# Patient Record
Sex: FEMALE | Race: WHITE | HISPANIC OR LATINO | Employment: OTHER | URBAN - METROPOLITAN AREA
[De-identification: names, ages, dates, MRNs, and addresses within clinical notes are randomized per-mention and may not be internally consistent; named-entity substitution may affect disease eponyms.]

---

## 2022-03-07 ENCOUNTER — HOSPITAL ENCOUNTER (OUTPATIENT)
Dept: RADIOLOGY | Facility: MEDICAL CENTER | Age: 62
End: 2022-03-07
Attending: STUDENT IN AN ORGANIZED HEALTH CARE EDUCATION/TRAINING PROGRAM
Payer: COMMERCIAL

## 2022-03-07 DIAGNOSIS — Z12.31 ENCOUNTER FOR MAMMOGRAM TO ESTABLISH BASELINE MAMMOGRAM: ICD-10-CM

## 2022-03-07 PROCEDURE — 77063 BREAST TOMOSYNTHESIS BI: CPT

## 2022-03-18 ENCOUNTER — HOSPITAL ENCOUNTER (OUTPATIENT)
Dept: RADIOLOGY | Facility: MEDICAL CENTER | Age: 62
End: 2022-03-18
Payer: COMMERCIAL

## 2023-02-01 ENCOUNTER — HOSPITAL ENCOUNTER (OUTPATIENT)
Dept: RADIOLOGY | Facility: MEDICAL CENTER | Age: 63
End: 2023-02-01
Attending: STUDENT IN AN ORGANIZED HEALTH CARE EDUCATION/TRAINING PROGRAM
Payer: COMMERCIAL

## 2023-02-01 DIAGNOSIS — N39.0 URINARY TRACT INFECTION WITHOUT HEMATURIA, SITE UNSPECIFIED: ICD-10-CM

## 2023-02-01 PROCEDURE — 76770 US EXAM ABDO BACK WALL COMP: CPT

## 2023-06-20 ENCOUNTER — HOSPITAL ENCOUNTER (OUTPATIENT)
Dept: RADIOLOGY | Facility: MEDICAL CENTER | Age: 63
End: 2023-06-20
Attending: STUDENT IN AN ORGANIZED HEALTH CARE EDUCATION/TRAINING PROGRAM
Payer: COMMERCIAL

## 2023-06-20 ENCOUNTER — OFFICE VISIT (OUTPATIENT)
Dept: RHEUMATOLOGY | Facility: MEDICAL CENTER | Age: 63
End: 2023-06-20
Attending: STUDENT IN AN ORGANIZED HEALTH CARE EDUCATION/TRAINING PROGRAM
Payer: COMMERCIAL

## 2023-06-20 ENCOUNTER — HOSPITAL ENCOUNTER (OUTPATIENT)
Dept: LAB | Facility: MEDICAL CENTER | Age: 63
End: 2023-06-20
Attending: STUDENT IN AN ORGANIZED HEALTH CARE EDUCATION/TRAINING PROGRAM
Payer: COMMERCIAL

## 2023-06-20 VITALS
BODY MASS INDEX: 35.08 KG/M2 | HEIGHT: 63 IN | OXYGEN SATURATION: 99 % | SYSTOLIC BLOOD PRESSURE: 122 MMHG | TEMPERATURE: 97.5 F | WEIGHT: 198 LBS | HEART RATE: 72 BPM | DIASTOLIC BLOOD PRESSURE: 82 MMHG

## 2023-06-20 DIAGNOSIS — M13.80 SERONEGATIVE INFLAMMATORY ARTHRITIS: ICD-10-CM

## 2023-06-20 DIAGNOSIS — D84.9 IMMUNOSUPPRESSED STATUS (HCC): ICD-10-CM

## 2023-06-20 DIAGNOSIS — Z79.899 LONG-TERM USE OF HYDROXYCHLOROQUINE: ICD-10-CM

## 2023-06-20 DIAGNOSIS — M17.0 PRIMARY OSTEOARTHRITIS OF BOTH KNEES: ICD-10-CM

## 2023-06-20 LAB
BASOPHILS # BLD AUTO: 0.8 % (ref 0–1.8)
BASOPHILS # BLD: 0.05 K/UL (ref 0–0.12)
EOSINOPHIL # BLD AUTO: 0.12 K/UL (ref 0–0.51)
EOSINOPHIL NFR BLD: 1.9 % (ref 0–6.9)
ERYTHROCYTE [DISTWIDTH] IN BLOOD BY AUTOMATED COUNT: 46.1 FL (ref 35.9–50)
ERYTHROCYTE [SEDIMENTATION RATE] IN BLOOD BY WESTERGREN METHOD: 6 MM/HOUR (ref 0–25)
HCT VFR BLD AUTO: 45.4 % (ref 37–47)
HGB BLD-MCNC: 14.8 G/DL (ref 12–16)
IMM GRANULOCYTES # BLD AUTO: 0.01 K/UL (ref 0–0.11)
IMM GRANULOCYTES NFR BLD AUTO: 0.2 % (ref 0–0.9)
LYMPHOCYTES # BLD AUTO: 1.35 K/UL (ref 1–4.8)
LYMPHOCYTES NFR BLD: 21.2 % (ref 22–41)
MCH RBC QN AUTO: 31 PG (ref 27–33)
MCHC RBC AUTO-ENTMCNC: 32.6 G/DL (ref 32.2–35.5)
MCV RBC AUTO: 95.2 FL (ref 81.4–97.8)
MONOCYTES # BLD AUTO: 0.58 K/UL (ref 0–0.85)
MONOCYTES NFR BLD AUTO: 9.1 % (ref 0–13.4)
NEUTROPHILS # BLD AUTO: 4.25 K/UL (ref 1.82–7.42)
NEUTROPHILS NFR BLD: 66.8 % (ref 44–72)
NRBC # BLD AUTO: 0 K/UL
NRBC BLD-RTO: 0 /100 WBC (ref 0–0.2)
PLATELET # BLD AUTO: 287 K/UL (ref 164–446)
PMV BLD AUTO: 9.2 FL (ref 9–12.9)
RBC # BLD AUTO: 4.77 M/UL (ref 4.2–5.4)
WBC # BLD AUTO: 6.4 K/UL (ref 4.8–10.8)

## 2023-06-20 PROCEDURE — 99212 OFFICE O/P EST SF 10 MIN: CPT | Performed by: STUDENT IN AN ORGANIZED HEALTH CARE EDUCATION/TRAINING PROGRAM

## 2023-06-20 PROCEDURE — 3079F DIAST BP 80-89 MM HG: CPT | Performed by: STUDENT IN AN ORGANIZED HEALTH CARE EDUCATION/TRAINING PROGRAM

## 2023-06-20 PROCEDURE — 99204 OFFICE O/P NEW MOD 45 MIN: CPT | Mod: GC | Performed by: STUDENT IN AN ORGANIZED HEALTH CARE EDUCATION/TRAINING PROGRAM

## 2023-06-20 PROCEDURE — 77077 JOINT SURVEY SINGLE VIEW: CPT

## 2023-06-20 PROCEDURE — 86140 C-REACTIVE PROTEIN: CPT

## 2023-06-20 PROCEDURE — 36415 COLL VENOUS BLD VENIPUNCTURE: CPT

## 2023-06-20 PROCEDURE — 85025 COMPLETE CBC W/AUTO DIFF WBC: CPT

## 2023-06-20 PROCEDURE — 85652 RBC SED RATE AUTOMATED: CPT

## 2023-06-20 PROCEDURE — 86812 HLA TYPING A B OR C: CPT

## 2023-06-20 PROCEDURE — 3074F SYST BP LT 130 MM HG: CPT | Performed by: STUDENT IN AN ORGANIZED HEALTH CARE EDUCATION/TRAINING PROGRAM

## 2023-06-20 PROCEDURE — 80053 COMPREHEN METABOLIC PANEL: CPT

## 2023-06-20 PROCEDURE — 83516 IMMUNOASSAY NONANTIBODY: CPT

## 2023-06-20 RX ORDER — ACETAMINOPHEN AND CODEINE PHOSPHATE 300; 30 MG/1; MG/1
TABLET ORAL
COMMUNITY

## 2023-06-20 RX ORDER — SUMATRIPTAN 100 MG/1
TABLET, FILM COATED ORAL
COMMUNITY
Start: 2021-10-24

## 2023-06-20 RX ORDER — ESTRADIOL 0.1 MG/G
CREAM VAGINAL
COMMUNITY
Start: 2021-10-22

## 2023-06-20 RX ORDER — HYDROXYCHLOROQUINE SULFATE 200 MG/1
TABLET, FILM COATED ORAL
COMMUNITY
Start: 2023-05-15 | End: 2023-08-01 | Stop reason: SDUPTHER

## 2023-06-20 RX ORDER — PERPHENAZINE/AMITRIPTYLINE HCL 2 MG-25 MG
1 TABLET ORAL DAILY
COMMUNITY
Start: 2021-10-22 | End: 2023-10-22

## 2023-06-20 RX ORDER — PROPRANOLOL HYDROCHLORIDE 40 MG/1
TABLET ORAL
COMMUNITY
Start: 2021-10-22

## 2023-06-20 RX ORDER — FLUTICASONE PROPIONATE 50 MCG
SPRAY, SUSPENSION (ML) NASAL
COMMUNITY

## 2023-06-20 ASSESSMENT — PATIENT HEALTH QUESTIONNAIRE - PHQ9: CLINICAL INTERPRETATION OF PHQ2 SCORE: 0

## 2023-06-20 NOTE — PROGRESS NOTES
[facility] RHEUMATOLOG[facility]  [address], [address], [place] [address]  Phone: [phone] ? Fax: [phone]    RHEUMATOLOGY NEW PATIENT VISIT NOTE      DATE OF SERVICE: 06/20/2023         Subjective     REFERRING PRACTITIONER:  Yelena Olmedo P.A.-C.  [address]  [address]  [place] [address]    PATIENT IDENTIFICATION:  Daisy Ugalde  [address]  [place] [address]    YOB: 1960    MEDICAL RECORD NUMBER: [id number]         CHIEF COMPLAINT:   Chief Complaint   Patient presents with    New Patient     Rheumatoid arthritis, unspecified       HISTORY OF PRESENT ILLNESS:  Daisy Ugalde is a 63 y.o. female with pertinent history notable for rheumatoid arthritis diagnosed in 10/2008, osteoarthritis of both knees, CKD stage 3, renal cell carcinoma s/p left nephrectomy in 2012. Presents for rheumatologic evaluation in the setting of her chronic rheumatoid arthritis. Patient was previously following with [facility] rheumatologist and has since moved to [place] 2 years ago. She is currently on Methotrexate 2.5mg at 6 tabs per week and hydroxychloroquine at 200mg daily. She was initially diagnosed with RA around 2008 and she reports she started the above therapy in 2012. She notes that in the morning she has stiffness and soreness for about 30 mins which resolves after moving her fingers around. She notes the pain is worse in her hands wrists and elbows. She mentions her bilateral osteoarthritis pain also gives her trouble at times. She manages it with tylenol and occasional topical diclofenac. She also mentions her right middle finger has a problem with locking up during the PM which has been ongoing for a few months now.     Pertinent treatment history: methotrexate and hydroxychloroquine, per patient report since 2012  Pertinent laboratory results: Low eGFR (53 on 1/2023), negative: RF, CCP, Sed Rate, CRP on 1/23/2023 labs    REVIEW OF SYSTEMS:  Except as  noted in the history above, relevant review of systems with emphasis on autoimmune rheumatic diseases was otherwise negative.      ACTIVE PROBLEM LIST:  Patient Active Problem List    Diagnosis Date Noted    Seronegative inflammatory arthritis 06/20/2023    Primary osteoarthritis of both knees 06/20/2023    Immunosuppressed status (HCC) 06/20/2023    Long-term use of hydroxychloroquine 06/20/2023       PAST MEDICAL HISTORY:  Rheumatoid arthritis  Carpal tunnel syndrome  Bilateral Osteoarthritis of the knee  CKD stage 3  History of Renal Cell carcinoma    PAST SURGICAL HISTORY:  Left Nephrectomy, 2012  Bilateral knee arthroscopy, 1982      MEDICATIONS:  Current Outpatient Medications   Medication Sig    FOLIC ACID PO Take  by mouth.    acetaminophen-codeine #3 (TYLENOL #3) 300-30 MG Tab acetaminophen 300 mg-codeine 30 mg tablet   TAKE 1 TABLET BY MOUTH EVERY 6 HOURS FOR UP TO 5 DAYS AS NEEDED FOR SEVERE PAIN    estradiol (ESTRACE) 0.1 MG/GM vaginal cream estradiol 0.01% (0.1 mg/gram) vaginal cream   APPLY 1 GRAM VAGINALLY TWICE A WEEK FOR 90 DAYS    fluticasone (FLONASE) 50 MCG/ACT nasal spray Allergy Relief (fluticasone) 50 mcg/actuation nasal spray,suspension   Spray 1 spray every day by intranasal route.    hydroxychloroquine (PLAQUENIL) 200 MG Tab TAKE 1 TABLET BY MOUTH DAILY WITH FOOD OR MILK. FOR RHEUMATOID ARTHRITIS    methotrexate 2.5 MG Tab Take 15 mg by mouth.    Multiple Vitamins-Minerals (WOMENS DAILY FORMULA) Tab Take 1 Tablet by mouth every day.    propranolol (INDERAL) 40 MG Tab propranolol 40 mg tablet   TAKE 1/2 TABLET BY MOUTH TWICE DAILY TO PREVENT MIGRAINES    sumatriptan (IMITREX) 100 MG tablet sumatriptan 100 mg tablet   Take by oral route.    Probiotic Product (PROBIOTIC PO) Take  by mouth.    D-MANNOSE PO Take  by mouth.       ALLERGIES:   Allergies   Allergen Reactions    Cat Hair Extract     Gold Rai Ult Wash-Exfoliating [Kdc:Cocamide+Cocamidopropyl+Yellow Dye+Aloe+Benzethonium  "Chloride+...]     Nsaids      History of nephrectomy    History of nephrectomy       IMMUNIZATIONS:  There is no immunization history on file for this patient.    SOCIAL HISTORY:   Social History     Socioeconomic History    Marital status:    Tobacco Use    Smoking status: Never    Smokeless tobacco: Never       FAMILY HISTORY:  History reviewed. No pertinent family history.         Objective     Vital Signs: /82 (BP Location: Left arm, Patient Position: Sitting, BP Cuff Size: Adult)   Pulse 72   Temp 36.4 °C (97.5 °F) (Temporal)   Ht 1.6 m (5' 3\")   Wt 89.8 kg (198 lb)   SpO2 99% Body mass index is 35.07 kg/m².    General: Appears well and comfortable  Eyes: No scleral or conjunctival lesions  ENT: No apparent oral or nasal lesions  Head/Neck: No apparent scalp or neck lesions  Cardiovascular: Regular rate and rhythm; no pericardial rubs  Respiratory: Breathing quiet and unlabored; no rales or pleural rubs  Gastrointestinal: No apparent organomegaly or abdominal masses  Integumentary: No significant cutaneous lesions or discolorations  Musculoskeletal: Mild tenderness to multiple joints; right middle finger MCP and PIP, mild tenderness of left hand middle and index finger PIP, bilateral shoulder, bilateral knees. No periarticular soft tissue swelling, warmth, erythema, or overt synovitis; no significant restriction in range of motion of joints examined  Neurologic: No focal sensory or motor deficits  Psychiatric: Mood and affect appropriate      LABORATORY RESULTS REVIEWED AND INTERPRETED BY ME:  Lab Results   Component Value Date/Time    HEMOGLOBIN 13.9 10/23/2021 09:07 AM    HEMATOCRIT 45.3 10/23/2021 09:07 AM     10/23/2021 09:07 AM    RDW 13.5 10/23/2021 09:07 AM    PLATELETCT 259 10/23/2021 09:07 AM    NEUTS 2.3 10/23/2021 09:07 AM    LYMPHOCYTES 27 10/23/2021 09:07 AM    MONOCYTES 11 10/23/2021 09:07 AM    EOSINOPHILS 2 10/23/2021 09:07 AM    BASOPHILS 1 10/23/2021 09:07 AM     Lab " Results   Component Value Date/Time    ASTSGOT 25 10/24/2019 11:01 PM    ALTSGPT 23 10/23/2021 09:07 AM     Lab Results   Component Value Date/Time    SODIUM 137 07/26/2021 07:22 AM    POTASSIUM 4.0 07/26/2021 07:22 AM    CHLORIDE 102 07/26/2021 07:22 AM    CO2 28 07/26/2021 07:22 AM    GLUCOSE 84 07/26/2021 07:22 AM    BUN 21 07/26/2021 07:22 AM    CREATININE 0.89 10/23/2021 09:07 AM     Lab Results   Component Value Date/Time    HBA1C 5.3 10/23/2021 09:07 AM       All relevant laboratory and imaging results reported on this note were reviewed and interpreted by me.         Assessment & Plan     Daisy Ugalde is a 63 y.o. female with history as noted above whose presentation merits the following diagnostic and clinical status impressions and recommendations:    1. Seronegative inflammatory arthritis  Clinically very low disease activity with no significant evidence of progressive inflammatory arthritis as most of her reported pain is biomechanical arthralgia likely secondary to osteoarthritis. Per review of [facility] records did have a mildly elevated RF in the past, most recent labs on 1/2023 noted negative RF, Anti-CCP, Sed rate and CRP. In addition to RA labs, will check anti-carbamylated protein antibody and HLA-B27. Depending on the results of testing may consider a trial of discontinuing her RA medications.   - Miscellaneous Test; Future (Anti-carbamylated protein antibody)  - HLA-B27; Future  - Sed Rate; Future  - CRP QUANTITIVE (NON-CARDIAC); Future  - DX-JOINT SURVEY-HANDS SINGLE VIEW; Future  - Continue methotrexate 15 mg oral weekly with folic acid 1 mg daily except the day of methotrexate  - Continue hydroxychloroquine 200 mg daily    2. Primary osteoarthritis of both knees  Presumably a significant etiology of knee pain burden which can be managed supportively.  - Voltaren 1% gel and Tylenol Arthritis PRN as she needs to avoid oral NSAIDs due to her CKD  - Consider intra-articular steroid injection  if that becomes necessary    3. Immunosuppressed status (HCC)  Presently with no reported history, physical exam, or lab abnormalities but need routine monitoring per guidelines.   - CBC WITH DIFFERENTIAL; Future  - Comp Metabolic Panel; Future    4. Long-term use of hydroxychloroquine  Minimal to no risk of retinal toxicity given the low dose of hydroxychloroquine prescribed (less than 5 mg/kg of body weight) per rheumatology and ophthalmology guidelines. However, ophthalmologic evaluation is still recommended with the frequency of routine follow-up eye exams determined by the ophthalmologist, typically annually or every other year.  - CBC WITH DIFFERENTIAL; Future  - Comp Metabolic Panel; Future  - Routine ophthalmology evaluation as recommended      The above assessment and plan were discussed with the patient who acknowledged understanding of the plan.    FOLLOW-UP: Return in about 6 months (around 12/20/2023) for Short.         Thank you for the opportunity to participate in the care of Daisy Ugalde.    Popeye Mcintosh M.D.  [facility] Internal Medicine Resident, PGY-3      ATTENDING ATTESTATION:  I personally saw and examined this patient and supervised the trainee who participated in the care of the patient. This documentation was initiated by the trainee using my office visit note template as instructed by me. I have carefully reviewed the note in its entirety and made appropriate modifications, and hereby attest that it accurately reflects the history, physical exam, assessment and plan of care for the patient.    Gustavo Roberts MD, MS  Rheumatologist, [facility] ? [facility]   of Clinical Medicine, Department of Internal Medicine  [facility] ? [facility]

## 2023-06-20 NOTE — PATIENT INSTRUCTIONS
AFTER VISIT INSTRUCTIONS    Below are important information to help you navigate your healthcare needs and help us serve you safely and effectively:  If laboratory tests and/or imaging studies were ordered, remember to go get them done as instructed.  If new prescriptions and/or refills were sent, remember to go pick them up from your local pharmacy, or call the specialty pharmacy to request shipment.  Always take your prescription medications exactly as prescribed unless instructed otherwise.  Note that antirheumatic drugs and steroids are immunosuppressive which means they increase your risk of infections and have multiple potential adverse effects on various organ systems in your body, though most of them are uncommon.  It is important that you are up-to-date on age-appropriate immunizations, particularly shingles and bacterial/viral pneumonia vaccines, which you can request from me or your primary care provider.  Be sure to read the drug package inserts to learn about the potential side effects of your medications before you start taking them.  If you experience any significant drug side effects, stop taking the medication and notify me promptly, and depending on the severity of the side effects, consider going to an urgent care or emergency department for immediate attention.  If there are significant findings on your lab tests and imaging studies that warrant further action, I will notify you with explanations via [email or web address] or phone call, otherwise you can view them on Epiclist and let me know if you have any questions.  Note that [employer] messages are typically read during office hours and may take 1-7 business days before a response depending on the urgency of the situation and how busy my clinic schedule is.  In general, [employer] messaging is for non-urgent matters that do not require immediate attention, so for urgent matters that cannot wait, you are advised to go to an urgent care.  Lastly, you are granted  MyChart access to my documentation of your visit and are encouraged to read my note which details my assessment and plan for your condition.

## 2023-06-21 LAB
ALBUMIN SERPL BCP-MCNC: 4.4 G/DL (ref 3.2–4.9)
ALBUMIN/GLOB SERPL: 1.5 G/DL
ALP SERPL-CCNC: 85 U/L (ref 30–99)
ALT SERPL-CCNC: 22 U/L (ref 2–50)
ANION GAP SERPL CALC-SCNC: 12 MMOL/L (ref 7–16)
AST SERPL-CCNC: 20 U/L (ref 12–45)
BILIRUB SERPL-MCNC: 0.4 MG/DL (ref 0.1–1.5)
BUN SERPL-MCNC: 24 MG/DL (ref 8–22)
CALCIUM ALBUM COR SERPL-MCNC: 9.5 MG/DL (ref 8.5–10.5)
CALCIUM SERPL-MCNC: 9.8 MG/DL (ref 8.5–10.5)
CHLORIDE SERPL-SCNC: 103 MMOL/L (ref 96–112)
CO2 SERPL-SCNC: 26 MMOL/L (ref 20–33)
CREAT SERPL-MCNC: 1.03 MG/DL (ref 0.5–1.4)
CRP SERPL HS-MCNC: <0.3 MG/DL (ref 0–0.75)
GFR SERPLBLD CREATININE-BSD FMLA CKD-EPI: 61 ML/MIN/1.73 M 2
GLOBULIN SER CALC-MCNC: 3 G/DL (ref 1.9–3.5)
GLUCOSE SERPL-MCNC: 83 MG/DL (ref 65–99)
POTASSIUM SERPL-SCNC: 5.1 MMOL/L (ref 3.6–5.5)
PROT SERPL-MCNC: 7.4 G/DL (ref 6–8.2)
SODIUM SERPL-SCNC: 141 MMOL/L (ref 135–145)

## 2023-06-22 LAB
CARBAMYLATED PROTEIN ANTIBODY, IGG Q6043: 12 UNITS (ref 0–19)
HLA-B27 QL FC: NEGATIVE

## 2023-07-03 ENCOUNTER — PATIENT MESSAGE (OUTPATIENT)
Dept: RHEUMATOLOGY | Facility: MEDICAL CENTER | Age: 63
End: 2023-07-03
Payer: COMMERCIAL

## 2023-07-03 DIAGNOSIS — M13.80 SERONEGATIVE INFLAMMATORY ARTHRITIS: ICD-10-CM

## 2023-07-28 DIAGNOSIS — M13.80 SERONEGATIVE INFLAMMATORY ARTHRITIS: ICD-10-CM

## 2023-08-01 DIAGNOSIS — M13.80 SERONEGATIVE INFLAMMATORY ARTHRITIS: ICD-10-CM

## 2023-08-01 DIAGNOSIS — Z79.899 LONG-TERM USE OF HYDROXYCHLOROQUINE: ICD-10-CM

## 2023-08-01 RX ORDER — HYDROXYCHLOROQUINE SULFATE 200 MG/1
TABLET, FILM COATED ORAL
Qty: 30 TABLET | Refills: 1 | Status: SHIPPED | OUTPATIENT
Start: 2023-08-01 | End: 2023-08-01

## 2023-08-01 RX ORDER — HYDROXYCHLOROQUINE SULFATE 200 MG/1
TABLET, FILM COATED ORAL
Qty: 30 TABLET | Refills: 1 | Status: SHIPPED | OUTPATIENT
Start: 2023-08-01

## 2023-11-06 ENCOUNTER — PATIENT MESSAGE (OUTPATIENT)
Dept: RHEUMATOLOGY | Facility: MEDICAL CENTER | Age: 63
End: 2023-11-06
Payer: COMMERCIAL

## 2023-11-06 DIAGNOSIS — M13.80 SERONEGATIVE INFLAMMATORY ARTHRITIS: ICD-10-CM

## 2023-11-06 DIAGNOSIS — D84.9 IMMUNOSUPPRESSED STATUS (HCC): ICD-10-CM

## 2023-12-03 ASSESSMENT — RHEUMATOLOGY FOLLOW-UP QUESTIONNAIRE
DURATION: >1 HOUR
CHIEF_COMPLAINT: FOLLOW UP
MARK ALL THE AREAS OF PAIN: 94052226
HELPFUL_MEDICATIONS: TYLENOL
RATE_1TO10: 3
JOINT SWELLING: Y
JOINT INSTABILITY: Y
CHARACTERISTIC: BETTER WITH ACTIVITY
JOINT PAIN: WORSE WITH ACTIVITY

## 2023-12-05 ENCOUNTER — OFFICE VISIT (OUTPATIENT)
Dept: RHEUMATOLOGY | Facility: MEDICAL CENTER | Age: 63
End: 2023-12-05
Attending: STUDENT IN AN ORGANIZED HEALTH CARE EDUCATION/TRAINING PROGRAM
Payer: COMMERCIAL

## 2023-12-05 VITALS
HEIGHT: 63 IN | SYSTOLIC BLOOD PRESSURE: 130 MMHG | BODY MASS INDEX: 38.45 KG/M2 | DIASTOLIC BLOOD PRESSURE: 84 MMHG | HEART RATE: 85 BPM | WEIGHT: 217 LBS | TEMPERATURE: 97.2 F | OXYGEN SATURATION: 97 %

## 2023-12-05 DIAGNOSIS — Z79.899 LONG-TERM USE OF HYDROXYCHLOROQUINE: ICD-10-CM

## 2023-12-05 DIAGNOSIS — D84.9 IMMUNOSUPPRESSED STATUS (HCC): ICD-10-CM

## 2023-12-05 DIAGNOSIS — M17.0 PRIMARY OSTEOARTHRITIS OF BOTH KNEES: ICD-10-CM

## 2023-12-05 DIAGNOSIS — M13.80 SERONEGATIVE INFLAMMATORY ARTHRITIS: ICD-10-CM

## 2023-12-05 PROCEDURE — 3075F SYST BP GE 130 - 139MM HG: CPT | Performed by: STUDENT IN AN ORGANIZED HEALTH CARE EDUCATION/TRAINING PROGRAM

## 2023-12-05 PROCEDURE — 3079F DIAST BP 80-89 MM HG: CPT | Performed by: STUDENT IN AN ORGANIZED HEALTH CARE EDUCATION/TRAINING PROGRAM

## 2023-12-05 PROCEDURE — 99214 OFFICE O/P EST MOD 30 MIN: CPT | Performed by: STUDENT IN AN ORGANIZED HEALTH CARE EDUCATION/TRAINING PROGRAM

## 2023-12-05 ASSESSMENT — FIBROSIS 4 INDEX: FIB4 SCORE: 0.95

## 2023-12-05 NOTE — PROGRESS NOTES
[facility] RHEUMATOLOGY  [address], [address], [place] [address]  Phone: [phone] ? Fax: [phone]  [facility]/Health-Services/Rheumatology    FOLLOW-UP VISIT NOTE      DATE OF SERVICE: 12/05/2023         Subjective     PRIMARY CARE PRACTITIONER:  Yelena Olmedo P.A.-C.  [address]  [place] [address]    PATIENT IDENTIFICATION:  Daisy Ugalde  [address]  [place] [address]    YOB: 1960    MEDICAL RECORD NUMBER: [id number]          CHIEF COMPLAINT:   Chief Complaint   Patient presents with    Follow-Up     Seronegative inflammatory arthritis       RHEUMATOLOGIC HISTORY:  Daisy Ugalde is a 63 y.o. female with pertinent history notable for seronegative inflammatory arthritis previously diagnosed as rheumatoid arthritis in 10/2008, osteoarthritis of both knees s/p bilateral arthroscopic debridement, carpal tunnel syndrome, and renal cell carcinoma s/p left nephrectomy in 2012, and variable renal insufficiency among other comorbidities. Previously under the care of a rheumatologist at [facility] in [facility], she initially presented on 6/20/23 to establish care for continued evaluation and management of her condition. Reported mild intermittent joint pain/stiffness/swelling in her right hand third finger, wrists, elbows, and knees, less than 30 minutes of morning stiffness that quickly improves with activity, and worsening of joint pain with some activities. She she had been managing with Tylenol arthritis and Voltaren gel which provided relief. Reported other aspects of her symptoms and medical history as noted on the initial visit questionnaire.      Pertinent treatments: Steroid injection of right knee (in 10/2023), methotrexate 15 mg oral weekly with folic acid 1 mg daily (2012-present), hydroxychloroquine 200 mg daily (2012-present).    Pertinent positive labs: None.    Pertinent negative labs: Negative RF and anti-CCP (in  1/2023 at [facility]), anti-CarP, HLA-B27, CRP, ESR, eGFR, creatinine, LFTs, and CBC (in 6/2023).    Pertinent XR imaging: Hands/wrists (in 6/2023) with scattered subchondral cysts of MCP joints, right first metacarpal and right cuboid, and tiny marginal erosions of right third DIP joint and right fifth PIP joint, and mild degenerative changes; findings are uncertain but suggestive of possible erosive osteoarthritis or psoriatic arthritis.      INTERVAL HISTORY:  Reports interval history as noted on the questionnaire below or scanned under media tab.  [facility] Rheumatology Established Patient History Form    12/3/2023 11:19 AM PST - Filed by Patient   MAIN REASON FOR VISIT Follow up   INTERVAL HISTORY OF ILLNESS   Date of worsening onset:    Preceding incident/ailment:    Describe/list your symptoms: Difficulty straightening middle finger on right hand.  Recently experienced extreme pain in right knee.  However, it is improving.   Exacerbating factors:    Alleviating factors:    Helpful medications: Tylenol   Ineffective medications:    Severity of pain (scale of 1-10): 3   Personal/emotional stressors:    Ricardo All The Areas Of Pain    REVIEW OF SYMPTOMS    General   Fevers    Chills    Night sweats    Malaise    Fatigue    Unintentional weight loss    Musculoskeletal   Joint pain Worse with activity   Morning stiffness duration >1 hour   Morning stiffness characteristic Better with activity   Joint swelling Yes   Joint instability Yes   Tendon pain    Muscle pain    Body aches    Dermatologic   Hair loss with bald spots    Hair shedding    Skin thickening    Skin plaques    Sunlight-induced skin rash    Cold-induced color changes (white, purple, red on rewarming)    Neurologic/Psychiatric   Weakness    Spasms    Tingling    Burning    Numbness    Insomnia    Anxiety    Depression    Head/Eyes   Headaches    Temple pain    Dizziness    Dry eyes    Eye pain    Eye redness    Blurry vision    Vision loss    Ears/Nose   Ear  pain    Ringing in ears    Vertigo    Hearing loss    Nasal ulcers    Nosebleeds    Sinus pain    Nasal congestion    Snoring    Mouth/Throat   Oral ulcers    Bleeding gums    Dry mouth    Cavities    Sore throat    Sticking in throat    Difficulty speaking    Neck/Lymphatics   Thyroid pain    Thyroid swelling    Lymph node swelling    Cardiac/Respiratory   Chest pain with breathing    Dry cough    Cough with bloody phlegm    Shortness of breath    Fast heartbeats    Irregular heartbeats    Gastrointestinal   Nausea    Vomiting    Difficulty swallowing    Heartburn    Abdominal pain    Bloody stool    Mucus stool    Genitourinary   Pelvic pain    Genital ulcers    Abnormal discharge    Burning urination    Frothy urine    Blood in urine      REVIEW OF SYSTEMS:  Except as noted in the history above, relevant review of systems with emphasis on autoimmune rheumatic diseases was otherwise negative.      ACTIVE PROBLEM LIST:  Patient Active Problem List    Diagnosis Date Noted    Seronegative inflammatory arthritis 06/20/2023    Primary osteoarthritis of both knees 06/20/2023    Immunosuppressed status (HCC) 06/20/2023    Long-term use of hydroxychloroquine 06/20/2023       PAST MEDICAL HISTORY:  No past medical history on file.    PAST SURGICAL HISTORY:  No past surgical history on file.    MEDICATIONS:  Current Outpatient Medications   Medication Sig    hydroxychloroquine (PLAQUENIL) 200 MG Tab TAKE 1 TABLET BY MOUTH DAILY WITH FOOD OR MILK. FOR RHEUMATOID ARTHRITIS    methotrexate 2.5 MG Tab Take 6 Tablets by mouth every 7 days.    FOLIC ACID PO Take  by mouth.    acetaminophen-codeine #3 (TYLENOL #3) 300-30 MG Tab acetaminophen 300 mg-codeine 30 mg tablet   TAKE 1 TABLET BY MOUTH EVERY 6 HOURS FOR UP TO 5 DAYS AS NEEDED FOR SEVERE PAIN    estradiol (ESTRACE) 0.1 MG/GM vaginal cream estradiol 0.01% (0.1 mg/gram) vaginal cream   APPLY 1 GRAM VAGINALLY TWICE A WEEK FOR 90 DAYS    fluticasone (FLONASE) 50 MCG/ACT nasal  "spray Allergy Relief (fluticasone) 50 mcg/actuation nasal spray,suspension   Spray 1 spray every day by intranasal route.    propranolol (INDERAL) 40 MG Tab propranolol 40 mg tablet   TAKE 1/2 TABLET BY MOUTH TWICE DAILY TO PREVENT MIGRAINES    sumatriptan (IMITREX) 100 MG tablet sumatriptan 100 mg tablet   Take by oral route.    Probiotic Product (PROBIOTIC PO) Take  by mouth.    D-MANNOSE PO Take  by mouth.       ALLERGIES:   Allergies   Allergen Reactions    Cat Hair Extract     Gold Rai Ult Wash-Exfoliating [Kdc:Cocamide+Cocamidopropyl+Yellow Dye+Aloe+Benzethonium Chloride+...]     Nsaids      History of nephrectomy    History of nephrectomy       IMMUNIZATIONS:  There is no immunization history on file for this patient.    SOCIAL HISTORY:   Social History     Socioeconomic History    Marital status:    Tobacco Use    Smoking status: Never    Smokeless tobacco: Never       FAMILY HISTORY:  No family history on file.         Objective     Vital Signs: /84 (BP Location: Right arm, Patient Position: Sitting, BP Cuff Size: Large adult long)   Pulse 85   Temp 36.2 °C (97.2 °F) (Temporal)   Ht 1.6 m (5' 3\")   Wt 98.4 kg (217 lb)   SpO2 97% Body mass index is 38.44 kg/m².    General: Appears well and comfortable  Eyes: No scleral or conjunctival lesions  ENT: No apparent oral or nasal lesions  Head/Neck: No apparent scalp or neck lesions  Cardiovascular: Regular rate and rhythm  Respiratory: Breathing quiet and unlabored  Gastrointestinal: No apparent organomegaly or abdominal masses  Integumentary: No significant cutaneous lesions or dyspigmentation  Musculoskeletal: Mild tenderness of knees medial, periarticular soft tissue swelling, warmth, erythema, or overt synovitis; no significant restriction in range of motion of joints examined  Neurologic: No focal sensory or motor deficits  Psychiatric: Mood and affect appropriate      LABORATORY RESULTS REVIEWED AND INTERPRETED BY ME:  Lab Results "   Component Value Date/Time    CREACTPROT <0.30 06/20/2023 12:22 PM    SEDRATEWES 6 06/20/2023 12:22 PM     Lab Results   Component Value Date/Time    EWLX56KZFB Negative 06/20/2023 12:22 PM     Lab Results   Component Value Date/Time    WBC 6.4 06/20/2023 12:22 PM    RBC 4.77 06/20/2023 12:22 PM    HEMOGLOBIN 14.8 06/20/2023 12:22 PM    HEMATOCRIT 45.4 06/20/2023 12:22 PM    MCV 95.2 06/20/2023 12:22 PM    MCH 31.0 06/20/2023 12:22 PM    MCHC 32.6 06/20/2023 12:22 PM    RDW 46.1 06/20/2023 12:22 PM    PLATELETCT 287 06/20/2023 12:22 PM    MPV 9.2 06/20/2023 12:22 PM    NEUTS 4.25 06/20/2023 12:22 PM    LYMPHOCYTES 21.20 (L) 06/20/2023 12:22 PM    MONOCYTES 9.10 06/20/2023 12:22 PM    EOSINOPHILS 1.90 06/20/2023 12:22 PM    BASOPHILS 0.80 06/20/2023 12:22 PM     Lab Results   Component Value Date/Time    ASTSGOT 20 06/20/2023 12:22 PM    ALTSGPT 22 06/20/2023 12:22 PM    ALKPHOSPHAT 85 06/20/2023 12:22 PM    TBILIRUBIN 0.4 06/20/2023 12:22 PM    TOTPROTEIN 7.4 06/20/2023 12:22 PM    ALBUMIN 4.4 06/20/2023 12:22 PM     Lab Results   Component Value Date/Time    SODIUM 141 06/20/2023 12:22 PM    POTASSIUM 5.1 06/20/2023 12:22 PM    CHLORIDE 103 06/20/2023 12:22 PM    CO2 26 06/20/2023 12:22 PM    GLUCOSE 83 06/20/2023 12:22 PM    BUN 24 (H) 06/20/2023 12:22 PM    CREATININE 1.03 06/20/2023 12:22 PM    CALCIUM 9.8 06/20/2023 12:22 PM     Lab Results   Component Value Date/Time    HBA1C 5.3 10/23/2021 09:07 AM       RADIOLOGY RESULTS REVIEWED AND INTERPRETED BY ME:  Results for orders placed in visit on 10/25/23    DX-KNEE COMPLETE 4+ RIGHT    Results for orders placed during the hospital encounter of 06/20/23    DX-JOINT SURVEY-HANDS SINGLE VIEW    Impression  1.  Mild degenerative changes.    2.  Scattered subchondral cysts and marginal erosions. Uncertain. Possible erosive osteoarthritis or psoriatic arthritis.      All relevant laboratory and imaging results reported on this note were reviewed and interpreted by  me.         Assessment & Plan     Daisy Ugalde is a 63 y.o. female with history as noted above whose presentation merits the following clinical impressions and recommendations:    1. Seronegative inflammatory arthritis  Clinically and serologically quiescent with no evidence of disease activity, so reasonable to de-escalate immunosuppression by discontinuing her DMARD therapy. Notably, there is no serologic evidence to suggest rheumatoid arthritis and the radiographs of her hands/wrists are more consistent with erosive osteoarthritis.  - CRP and ESR (to be ordered depending on her clinical trajectory)  - Discontinue methotrexate 15 mg oral weekly and hydroxychloroquine 200 mg daily after finishing the current supply    2. Primary osteoarthritis of both knees  Considered the most significant etiology of knee and other joint pain which can be managed supportively.  - Voltaren 1% gel and Tylenol Arthritis PRN as oral NSAIDs need to be avoided in the setting of her variable renal insufficiency  - Consider intra-articular steroid injection if that becomes necessary    3. Immunosuppressed status (HCC)  Presently with no history, physical, or laboratory evidence to suggest significant adverse drug effects or opportunistic infections, but need routine monitoring per guidelines.  - CBC and CMP (to be ordered later)  - Need to ascertain age-appropriate vaccines in addition to the ones listed above under immunizations    4. Long-term use of hydroxychloroquine  Minimal to no risk of retinal toxicity given the low dose of hydroxychloroquine prescribed (less than 5 mg/kg of body weight) per rheumatology and ophthalmology guidelines. However, ophthalmologic evaluation is still recommended with the frequency of routine follow-up eye exams determined by the ophthalmologist, typically annually or every other year.  - Routine ophthalmology evaluation as recommended      The above assessment and plan were discussed with the  patient who acknowledged understanding of the plan.    FOLLOW-UP: Return for follow-up TBD.         Thank you for the opportunity to participate in the care of Daisy Daviskrishna.    Gustavo Roberts MD, MS, FACR  Rheumatologist, [facility] Rheumatology ? [facility]   of Clinical Medicine, Department of Internal Medicine  [facility] ? [facility]

## 2023-12-05 NOTE — PATIENT INSTRUCTIONS
AFTER VISIT INSTRUCTIONS    Below are important information to help you navigate your healthcare needs and help us serve you safely and effectively:  If laboratory tests and/or imaging studies were ordered, remember to go get them done as instructed.  If new prescriptions and/or refills were sent, remember to go pick them up from your local pharmacy, or call the specialty pharmacy to request shipment.  Always take your prescription medications exactly as prescribed unless instructed otherwise.  Note that antirheumatic drugs and steroids are immunosuppressive which means they increase your risk of infections and have multiple potential adverse effects on various organ systems in your body, though most of them are uncommon.  It is important that you are up-to-date on age-appropriate immunizations, particularly shingles and bacterial/viral pneumonia vaccines, which you can request from me or your primary care provider.  Be sure to read the drug package inserts to learn about the potential side effects of your medications before you start taking them.  If you experience any significant drug side effects, stop taking the medication and notify me promptly, and depending on the severity of the side effects, consider going to an urgent care or emergency department for immediate attention.  If there are significant findings on your lab tests and imaging studies that warrant further action, I will notify you with explanations via [employer] or phone call, otherwise you can view them on [employer] and let me know if you have any questions.  Note that [employer] messages are typically read during office hours and may take 1-7 business days before a response depending on the urgency of the situation and how busy my clinic schedule is.  In general, [employer] messaging is for non-urgent matters that do not require immediate attention, so for urgent matters that cannot wait, you are advised to go to an urgent care.  Lastly, you are granted  [employer] access to my documentation of your visit and are encouraged to read my note which details my assessment and plan for your condition.  To learn more about your condition and rheumatic diseases evaluated and treated by rheumatologists, as well as gain access to many helpful resources about these diseases, visit our website at [email or web address].

## 2024-04-02 ENCOUNTER — APPOINTMENT (OUTPATIENT)
Dept: RADIOLOGY | Facility: MEDICAL CENTER | Age: 64
End: 2024-04-02
Attending: STUDENT IN AN ORGANIZED HEALTH CARE EDUCATION/TRAINING PROGRAM
Payer: COMMERCIAL

## 2024-04-02 DIAGNOSIS — N13.30 HYDRONEPHROSIS, UNSPECIFIED HYDRONEPHROSIS TYPE: ICD-10-CM

## 2024-04-02 PROCEDURE — 78708 K FLOW/FUNCT IMAGE W/DRUG: CPT

## 2024-04-02 RX ORDER — FUROSEMIDE 10 MG/ML
INJECTION INTRAMUSCULAR; INTRAVENOUS
Status: DISCONTINUED
Start: 2024-04-02 | End: 2024-04-03 | Stop reason: HOSPADM